# Patient Record
Sex: MALE | Race: WHITE | NOT HISPANIC OR LATINO | URBAN - METROPOLITAN AREA
[De-identification: names, ages, dates, MRNs, and addresses within clinical notes are randomized per-mention and may not be internally consistent; named-entity substitution may affect disease eponyms.]

---

## 2021-11-08 ENCOUNTER — TELEPHONE (OUTPATIENT)
Dept: PSYCHIATRY | Facility: CLINIC | Age: 31
End: 2021-11-08
Payer: COMMERCIAL

## 2021-11-08 NOTE — TELEPHONE ENCOUNTER
What is the concern that needs to be addressed by a nurse? Pt interested in starting TRD program, please call to schedule intake appts.     May a detailed message be left on voicemail? yes    Date of last office visit: na    Message routed to: slp new referrals

## 2022-03-13 ENCOUNTER — HEALTH MAINTENANCE LETTER (OUTPATIENT)
Age: 32
End: 2022-03-13

## 2022-03-14 ENCOUNTER — VIRTUAL VISIT (OUTPATIENT)
Dept: PSYCHIATRY | Facility: CLINIC | Age: 32
End: 2022-03-14
Payer: COMMERCIAL

## 2022-03-14 DIAGNOSIS — F33.2 SEVERE EPISODE OF RECURRENT MAJOR DEPRESSIVE DISORDER, WITHOUT PSYCHOTIC FEATURES (H): Primary | ICD-10-CM

## 2022-04-06 ENCOUNTER — VIRTUAL VISIT (OUTPATIENT)
Dept: PSYCHIATRY | Facility: CLINIC | Age: 32
End: 2022-04-06
Payer: COMMERCIAL

## 2022-04-06 DIAGNOSIS — F33.2 SEVERE EPISODE OF RECURRENT MAJOR DEPRESSIVE DISORDER, WITHOUT PSYCHOTIC FEATURES (H): Primary | ICD-10-CM

## 2022-04-06 RX ORDER — LAMOTRIGINE 100 MG/1
100 TABLET ORAL DAILY
COMMUNITY
Start: 2022-02-01

## 2022-04-06 RX ORDER — DULOXETIN HYDROCHLORIDE 60 MG/1
120 CAPSULE, DELAYED RELEASE ORAL DAILY
COMMUNITY
Start: 2022-02-01

## 2022-04-06 RX ORDER — CLONAZEPAM 1 MG/1
3 TABLET ORAL DAILY
COMMUNITY
Start: 2022-02-01

## 2022-04-06 RX ORDER — QUETIAPINE FUMARATE 100 MG/1
150 TABLET, FILM COATED ORAL AT BEDTIME
COMMUNITY
Start: 2022-02-01

## 2022-04-06 ASSESSMENT — PATIENT HEALTH QUESTIONNAIRE - PHQ9: SUM OF ALL RESPONSES TO PHQ QUESTIONS 1-9: 11

## 2022-04-06 NOTE — PROGRESS NOTES
"[facility] Treatment Resistant Depression Program  Diagnostic Assessment  A part of the [facility] Psychiatry Mood Disorders Program    Luis Armando Romero MRN# [id number]   Age: 31 year old YOB: 1990     Date of Evaluation: 3/14/22  Start Time: 3:00; End Time: 4:18      Mode of communication: [employer] (HIPAA compliant, secure platform). Patient consented verbally to this mode of therapy today.  Reason for telehealth: COVID-19. This patient visit was converted to a telehealth visit to minimize exposure to COVID-19.    Originating Location (patient location): home, located in [place], [place]  Distant Location (provider location): Home office, located in [place], [place], using appropriate privacy considerations and procedures         Care Team     PCP- No primary care provider on file.  Specialty Providers- no  Therapist- no  Psychiatric Med Management Provider- Dr Alegre at [facility]  Other Mental Health Providers- no    Referred by: family  Referred for evaluation of:  depression.         Contributors to the Assessment     Chart Reviewed.   Interview completed with Luis Armando MATHEWS Nick.  Releases of information signed by Luis Armando for none.  Collateral information obtained from none.           Chief Complaint     \"feel like it's [depression] gotten worse\"         History of Present Illness      Luis Armando Romero is a 31 year old male who goes by Luis Armando and uses he, him, his pronouns.    Luis Armando reports an ongoing and no psychiatric hospitalization(s). Luis Armando is interested in learning more about interventional treatment.    Luis Armando began having panic attacks when he was 9 or 10 years old. He was started on sertraline at that time and Luis Armando thinks the medication helped to some degree, but didn't resolve the symptoms. Luis Armando's first episode of depression was at age 11 or 12     Current psychosocial stressors discussed include not working, socially isolated, symptoms of depression and anxiety "     Discussed other mental health concerns apart from depression, including anxiety, panic attacks, trauma    Psych critical item history includes mutiple psychotropic trials  and substance use: alcohol    DATA     PHQ9 was completed today, 3/14/22  Scored at 12    Over the last 2 weeks, how often have you been bothered by any the following problems?    1. Little interest or pleasure in doing things: 3 - Nearly every day  2. Feeling down, depressed, or hopeless: 2 - More than half the days  3. Trouble falling or staying asleep, or sleeping too much: 1 - Several days  4. Feeling tired or having little energy: 3 - Nearly every day  5. Poor appetite or overeatin - Not at all  6. Feeling bad about yourself - or that you are a failure or have let yourself or your family down: 2 - More than half the days  7. Trouble concentrating on things, such as reading the newspaper or watching television: 1 - Several days  8. Moving or speaking so slowly that other people could have noticed. Or the opposite-being so fidgety or restless that you have been moving around a lot more than usual: 0 - Not at all  9. Thoughts that you would be better off dead, or of hurting yourself in some way: 0 - Not at all    If you checked off any problems, how difficulty have these problems made it for you to do your work, take care of things at home, or get along with other people? Very difficult     GAD7 was completed today, 3/14/22  Scored at 5    Over the last 2 weeks, how often have you been bothered by the following problems?    1. Feeling nervous, anxious or on edge: 1 - Several days  2. Not being able to stop or control worryin - Several days  3. Worrying too much about different things: 1 - Several days  4. Trouble relaxin - Not at all  5. Being so restless that it is hard to sit still: 0 - Not at all  6. Becoming easily annoyed or irritable: 2 - More than half the days  7. Feeling afraid as if something awful might happen: 0 - Not  "at all     CAGE-AID was completed today, 3/14/22  1. In the last three months, have you felt you should cut down or stop drinking or using drugs? yes  2. In the last three months, has anyone annoyed you or gotten on your nerves by telling you to cut down or stop drinking or using drugs? no  3. In the last three months, have you felt guilty or bad about how much you drink or use drugs? yes  4. In the last three months, have you been waking up wanting to have an alcoholic drink or use drugs? no         Psychiatric Review of Systems (Completed M.I.N.I. Version 7.0.2     A. DEPRESSION  Past 2 Weeks:  low mood nearly every day, anhedonia most of the time, low energy and difficulty concentrating, thinking or making decisions    Past Episode:  low mood nearly every day, anhedonia most of the time, low energy, worthlessness and/or guilt and difficulty concentrating, thinking or making decisions    B. SUICIDALITY: Current: denies, risk Low  -reports 0 lifetime suicide attempts  -reports 0% in response to \"How likely are to you to try to kill yourself within the next 3 months on a scale from 0-100%?\"  -denies current SI, denies plan and denies intent  -denies current SIB/Self Injurious Behavior, has a history of SIB in high school    C. DANIELA/HYPOMANIA  Current Episode:  none    Past Episode:  none    D. PANIC:  unprovoked anxiety/fear, peaking in < 10 minutes, heart palpitations, sweaty or clammy hands, tremors, SOB, dizziness, flushing or chills, extremity or Perioral (fingers, hand) paresthesia, derealization , depersonalization, fear of losing control or going crazy and fear of dying    E. AGORAPHOBIA:  marked fear/anxiety in enclosed space, alone away from home, alone at home and traveling by bus, train, car or using public transportation because of fear that escape might be difficult or help might not be available;    F. SOCIAL ANXIETY:  none    G. OBSESSIVE-COMPULSIVE:  none    H. TRAUMA:  none    I. ALCOHOL & J. " "NON-ALCOHOL:  See below    K. PSYCHOSIS:   none    L-M. EATING DISORDER: none    N. GENERALIZED ANXIETY:  none    O. RULE OUT MEDICAL, ORGANIC OR DRUG CAUSES FOR ALL DISORDERS  During any current disorder or past mood episode, patient reports:  A. Substance use or withdrawal: No  B. Medical illness: No    P. ANTISOCIAL PERSONALITY:  before age 14 - skipped school, ran away from home overnight, or stayed out against parents rules and since age 14 -  done illegal acts     Other Cluster B Traits Identified (not formally assessed):  none discussed    SUBSTANCE USE HISTORY                                                                 RECENT SUBSTANCE USE:     TOBACCO- none     CAFFEINE- unknown   ALCOHOL- two large drinks/day drink(s) per day \"drinking a lot the last two years\"  CANNABIS- none            OTHER ILLICIT DRUGS- none    Past Use-   TOBACCO- none       CAFFEINE- unknown   ALCOHOL-   CANNABIS- none            OTHER ILLICIT DRUGS- past kratum use, difficult to quit but hasn't used in in several years    CD Treatment Hx: No  Medical Consequences (eg HIV/Hepatitis)- No  Legal Consequences- No     PSYCHIATRIC HISTORY     Past diagnoses: panic disorder, major depressive disorder    Past medication trials: multiple    Hospitalizations: none    Commitment: No, Current Cohen order: No    ECT trials: No    TMS trials:  No      Ketamine:  No    Suicide attempts: No    Self-injurious behavior: Yes - past, as an adolescent    Violent behavior: Yes - verbal aggression when frustrated, has yelled at neighbors out of frustration. Luis Armando reports that he feels \"deep frustration\" with \"something not getting resolved.\" He notes that he can tolerate some degree of frustration but when he reaches his limit, he can blow up at people. Denies any physical aggression    Therapy, DBT, Day Treatment, Eating Disorder Tx etc]:   Current:  Medication management    Past:  Medication management and Outpatient individual " psychotherapy    SOCIAL and FAMILY HISTORY                        patient reported                                     Living situation: Luis Armando lives with his parents, in a Private Residence.   Guns, weapons, or other means to harm oneself in the home? No  Pets at home? Yes - one dog     Education: Luis Armando s highest level of education is GED    Occupation: Luis Armando is currently not working, at least in part due to symptoms of mental illness    Finances: Luis Armando is financial supported by Family Support    Relationships: Specific Relationships & Quality of Relationship: Luis Armando lives with his parents in a  part of the home and has dinner and spends time with them daily. He reports he tends to isolate a bit and struggles to engage socially, but has good friends from high school he keep in touch with through phone calls a few times/year.    Spiritual considerations: No    Cultural influences: Luis Armando identifies is race as white. Luis Armando reports  No  to cultural considerations to take into account when providing treatment.     Gender identity:  Luis Armando identifies as male and uses he/him/his pronouns.    Strengths & Coping Strategies:  Luis Armando is personable and easy to engage. He completed his GED after completing two years of high school in school.     Legal Hx: No    Trauma/Abuse Hx: None reported, also reports bullying significant enough for him to stop going to school for 11th and 12th grade     Hx: No    Family Mental Health Hx- father has depression; paternal cousin and aunt with depression; paternal cousin with bipolar    PAST PSYCH MED TRIALS      Will be reviewed during MTM.    MEDICAL / SURGICAL HISTORY                                   There is no problem list on file for this patient.      No past surgical history on file.     History of seizures: no   History of head trauma/loss of consciousness: unknown     ALLERGY                                Patient has no allergy information on  record.    MEDICATIONS                               No current outpatient medications on file.       VITALS                                                                                                                            3, 3   There were no vitals taken for this visit.     MENTAL STATUS EXAM                                                                                    9, 14 cog gs     Alertness: alert  and oriented  Appearance: unable to assess via video visit. Luis Armando reports that taking a shower is difficult for him, in part due to motivation and in part due to the condition of the bathroom  Behavior/Demeanor: cooperative, pleasant and passive, with fair  eye contact   Speech: increased latency of response  Language: intact and no problems  Psychomotor: normal or unremarkable  Mood: depressed  Affect: restricted and blunted; was congruent to mood; was congruent to content  Thought Process/Associations: unremarkable  Thought Content:  Reports none;  Denies violent ideation and delusions  Perception:  Reports none;  Denies auditory hallucinations and visual hallucinations  Insight: fair  Judgment: adequate for safety  Cognition: (6) does  appear grossly intact; formal cognitive testing was not done    PSYCHIATRIC DIAGNOSES                                                                                               Major depressive disorder  Panic disorder, by history     ASSESSMENT                                                                                                          m2, h3     Please note, writer did not receive all pertinent medical records as of the time of this assessment. Luis Armando did not sign ANDREA's for additional records.     Luis Armando Romero is a 31 year old single (never )  male with a psychiatric history of panic attacks and depression who presents for a [facility] Treatment Resistant Depression program evaluation. Luis Armando's father found the TRD program and  suggested an intake. He has a history of no psychiatric hospitalization.  Family mental health history includes depression, bipolar disorder, and anxiety.     Luis Armando's symptoms of depression started around the age of 11 or 12 and since then he has had some level of depression with periods of time that were more severe.    Today, Luis Armando presents as a Fair historian with Fair insight. He estimates one lifetime episodes of depression with onset at age 11 or 12. Luis Armando s past and present depressive symptoms seem consistent with a diagnosis of major depressive disorder. Depressive symptoms seem to contribute to impaired functioning in the areas of ADLs, social relationships, occupational performance and emotional wellbeing . Precipitating factors seem to include family history of mental illness, early onset of panic attacks and depression. Perpetuating factors may consist of bullying in high school, social isolation. Past treatment approaches include medication management, some individual therapy. Luis Armando is presently participating in medication management with interest in learning more about interventional treatments.    In addition, the M.I.N.I. Interview scores positively for a diagnosis/diagnoses of panic attacks. Substance use may be a current problem. Further diagnostic clarification is necessary to determine if alcohol use is affecting mood, anxiety.    Today, Luis Armando denies SI, denies a plan, and denies intent. He has notable risk factors for self-harm including lives alone/ isolated, severe anxiety and male.  However, risk is mitigated by no h/o suicide attempt, no plan or intent and no h/o risky impulsive behavior.  Based on all available evidence he does not appear to be at imminent risk for self-harm therefore does not meet criteria for a 72-hr hold/  involuntary hospitalization. Additional steps to minimize risk include: reaching out to parents if symptoms worsen, [facility]. 24/7 crisis resources were  provided verbally.     PLAN                                                                                                                        m2, h3   Next steps are as follows with intention of completing a comprehensive multi-disciplinary assessment utilizing today's evaluation, the expertise of a PharmD, as well as a Psychiatrist. Informed Luis Armando that if deemed appropriate for Interventional Psychiatry treatments, care will be provided with goal of stabilization with subsequent transfer back to the community (I.e. PCP or previous psychiatrist).    Medications: Will be addressed during an MTM visit and new patient medication evaluation with a Psychiatrist.     Therapy:  Yes - Luis Armando may benefit from restarting individual therapy. He could also consider a support group through [facility] or [facility]    Crisis Numbers:  did provided 24/7 crisis resources including but not limited to the following:  National Suicide Prevention Hotline: 1-585-363-CZAI (2571)  Crisis Text Line: Text START to 524-500    Other Referrals:  [facility]    RTC: For MTM visit.    MARY Latham

## 2022-04-06 NOTE — PROGRESS NOTES
Luis Armando is a 31 year old who is being evaluated via a billable video visit.      How would you like to obtain your AVS? MyChart  If the video visit is dropped, the invitation should be resent by: Send to e-mail at: [email or web address]  Will anyone else be joining your video visit? No      Video Start Time: 2:00 pm  Video-Visit Details    Type of service:  Video Visit    Video End Time:2:44 pm    Originating Location (pt. Location): Home    Distant Location (provider location):  [facility] PSY[facility]     Platform used for Video Visit: [email or web address]     Depression Response    Patient completed the PHQ-9 assessment for depression and scored >9? Yes  Question 9 on the PHQ-9 was positive for suicidality? No  Does patient have current mental health provider? Yes    Is this a virtual visit? Yes   Does patient have suicidal ideation (positive question 9)? No - offer to place Mental Health Referral.  Patient declined referral/not needed

## 2022-04-07 NOTE — PROGRESS NOTES
Service Date: 2022    M PHYSICIAN TRD PROGRAM MEDICATION THERAPY MANAGEMENT    This was a video visit from my home to the patient's home via [employer] due to COVID.  We used [employer], and in case we get disconnected, we will use the patient's [email or web address].  The patient's SMS is [phone].    Starting time 2:00 p.m.  Ending time 2:44 p.m.    DICTATION IDENTIFIER:  NAME:  Luis Armando Romero  :  1990.  VIDEO VISIT DATE:  2022.    The patient is a [facility] patient.     IDENTIFYING INFORMATION AND INTRODUCTION:  Luis Armando is a 31-year-old male seen on a video visit today for an M Physician TRD MT consultation.  He lives in [place], [place], and the patient is a new adult to the M Physician TRD program.    Psychiatrist is Maurice Au and he will see the patient on 2022 in person, which was communicated to the patient.    CHIEF COMPLAINTS:  Depression, some anxiety and panic attacks.    REASON FOR CONSULTATION:  Rating medication trials for antidepressant failure and assessment of antidepressant drugs and their history.    Informants include the patient and review of past medical records.  Please be aware I was not in the possession of most of his past medical records.    Time spent an hour without the patient and 44 minutes with the patient.    MEDICATION INFORMATION:    1.  Current Psychotropic Medications:  a.  Clonazepam 1 mg tablets.  The patient takes 3 mg at bedtime.  The patient is using clonazepam for the last 6 years and it appears that the dose has increased over time.  Indication:  Anxiety and sleep.  b.  Duloxetine/Cymbalta 60 mg capsule.  The patient takes 120 mg every a.m.  Indication:  Depression and anxiety.  c.  Lamotrigine 100 mg every morning.  Indication:  Mood stabilization,  d.  Quetiapine fumarate 150 mg at bedtime.  Indication:  Sleep.    2.  Concomitant Medications:  a.  Ibuprofen once per month for pain.    3.  Herbal Remedies:  a.  Vitamin D    4.   Drug/Drug Interactions:    a.  Concurrent use of clonazepam and quetiapine may result in increased risk of CNS depression.    5.  Current Reported Side Effects:  Questionable if his tiredness is from the medication, from the depression or from both.    6.  Gene Testing:  It was done.  The patient will try to find it and send it to the clinic or bring it to the clinic when he sees Dr. Au.    7.  Allergies:  Amoxicillin, rash.    PAST MEDICAL HISTORY:    1.  MDD.  2.  MER.  3.  PA.  4.  Asthma.  5.  Gallbladder removed laparoscopically.  6.  Family mental health:  Cousin with bipolar, aunt, depression, and dad has depression and anxiety.  7.  Self-injury behavior, cutting for 15 years.    DEPRESSION HISTORY:  1.  The patient stated that the whole depression issue started with a panic attack in summer of 2000 and he was treated and around 4257-8732, Zoloft was started.  It could have been that it was for the panic attacks as well as for the depression.    Last episode, patient said he cannot put a finger on it.  Since 2000, he is more or less incapacitated with his panic attacks and his depression.  2.  Hospitalization for severe suicidal ideation or suicide attempt:  Denied.  3.  Treatments:  Patient had partial hospitalization for 2 weeks and had outpatient treatment also in 1698-2636.  4.  Suicidal ideation:  The patient denied any suicidal ideation today.  5.  Individual psychotherapy:  The patient stated he saw counselors a lot of times in the past, but it was not very useful.  6.  Cognitive behavioral therapy:  Yes, was not effective.    SOCIAL AND ENVIRONMENTAL ASPECTS:  He lives with his parents and their dog.    PHARMACOTHERAPY INDICATORS:    A.  Pharmaceutical Aspects  1.  Economic Assessment:  The patient has MA, prescription coverage with his insurance plan.  Prescription drug copayment is 0.  The cost of obtaining prescribed medications does not interfere with compliance.  2.  Pharmacy:  The patient's  pharmacy is [facility] in [place].  3.  Compliance Assessment:  Patient is independent in medication administration.  He is a poor historian.  He does not use a pillbox and he says he misses medication maybe every 6 months or so.  When I asked the patient to whom he turns when the depression gets really severe, he says to my dad mostly.    B. Pharmacokinetic Aspects  4.  Habits and Chemical Use:  a. Alcohol:  The patient is currently drinking 2 strong drinks every night, bourbon.  I told the patient in case he is a TMS candidate, he has to stop drinking because it can lower the seizure threshold and he could have seizures with TMS.  b.  Tobacco:  He stopped 8 years ago.  c.  Caffeine:  A 16-ounce cup of coffee in the morning and 1 energy drink like Monster or Rockstar once a week, so I assume 16 ounces of regular coffee can be 250 mg of caffeine and then once a week Monster or Rockstar can be another  mg of caffeine.  d.  Recreational drugs:  Please be aware that the patient was using kratom for a whole year, and when he was younger, he used cannabis.  e.  Exercise:  He does not exercise currently.  In the summer, he walked around [facility] with his dad and he got a panic attack.  f.  Hobbies:  Not really.  He surfs the web, watches movies or TV.    RATING OF ANTIDEPRESSANT DRUGS:    Antidepressant treatment history using antidepressant resistance rating.  1.  Selective serotonin reuptake inhibitors (SSRIs):    a.  Citalopram/Celexa in 2007 was tried.  b.  Fluoxetine/Prozac was tried and worked for some time.  c.  Sertraline/Zoloft was the first drug tried around 4099-7595, and it was pushed up and it worked in the beginning.    2.  Serotonin noradrenaline reuptake inhibitors (SNRIs):  Mostly effective medications for patient so far.  a.  Duloxetine/Cymbalta from 2010 to present.  Max dose 120 mg per day.  The patient said it was effective in the beginning and whenever it lost its effectiveness the  dose was increased.  Overall it appears it was the best drug ever from all the medications he has tried.  Would give it a rating of 4.  b. Venlafaxine/Effexor from 6840-1001.  Max dose 300 mg per day.  It also worked somewhat and would give it a rating of 3.    3.  Serotonin modulators and stimulators:  Negative.    4.  Noradrenaline and dopamine reuptake inhibitors (NDRIs):  a.  Bupropion/Wellbutrin was tried.  It helped slightly with the depression, but his anxiety went up.    5.  Tricyclic antidepressants (TCAs):  The patient stated that he was on a tricyclic antidepressant at the outpatient treating center with no change in his depression or anxiety, but he does not remember which drug.    6.  Tetracyclic antidepressants:  Negative.    7.  Monoamine oxidase inhibitors (MAOIs):  Negative.    8.  Augmentation Therapy:  a.  Lamotrigine 200 mg per day.  Max dose 200 mg per day from 06/2008 to present.    9.  Miscellaneous augmentation therapy:  -- Antipsychotics.  a.  Aripiprazole/Abilify:  Yes.  He got restless legs with it.  b.  Brexpiprazole/Rexulti:  No change.  c.  Quetiapine/Seroquel:  Max dose 150 mg per day from 2008 to present.  It is used mostly for sleep.    -- Stimulants:  a.  Lisdexamfetamine/Vyvanse was tried in 2014, but stopped very fast due to increase in anxiety.    -- Benzodiazepines:    a.  Clonazepam from 2006 to present.  Max dose 3 mg per day.  The patient stated it works.    10.  Miscellaneous sleep aids:  a.  Diphenhydramine/Benadryl was tried and it works.  b.  Ambien/zolpidem:  Patient was very anxious to take it for a while because he was reading the side effects.  c.  Melatonin was tried.    d.  Clonazepam was tried.  e.  Doxylamine/Unisom was tried.    11.  Ketamine treatment history:  Negative.    12.  Other reported treatments for depression and related mood disorders:  Negative.    COMMENTS:    1.  The patient will follow up with MTM as needed.  2.  All MTM findings will be shared  with clinic psychiatrist.  3.  The patient was instructed to return for upcoming appointment with Dr. Au for recommendation and future treatment options.    Thank you for the opportunity to participate in the care of this patient.    Jyoti Alcocer, Mayank  Pharmaceutical Care Coordinator    Jyoti Alcocer, Mayank        D: 2022   T: 2022   MT: KARLA    Name:     UMESH DENNY  MRN:      [id number]        Account:      [id number]   :      1990           Service Date: 2022       Document: [id number]

## 2022-04-08 ENCOUNTER — OFFICE VISIT (OUTPATIENT)
Dept: PSYCHIATRY | Facility: CLINIC | Age: 32
End: 2022-04-08
Payer: COMMERCIAL

## 2022-04-08 VITALS
HEIGHT: 73 IN | SYSTOLIC BLOOD PRESSURE: 150 MMHG | DIASTOLIC BLOOD PRESSURE: 106 MMHG | BODY MASS INDEX: 33.56 KG/M2 | HEART RATE: 105 BPM | TEMPERATURE: 97.5 F | WEIGHT: 253.2 LBS

## 2022-04-08 DIAGNOSIS — F33.1 MODERATE EPISODE OF RECURRENT MAJOR DEPRESSIVE DISORDER (H): Primary | ICD-10-CM

## 2022-04-08 ASSESSMENT — PATIENT HEALTH QUESTIONNAIRE - PHQ9: SUM OF ALL RESPONSES TO PHQ QUESTIONS 1-9: 10

## 2022-04-08 NOTE — PROGRESS NOTES
" [facility]  [address], [address]  [place] [address]  New Patient Evaluation       Luis Armando Romero is a 31 year old male who prefers the name Luis Armando and pronoun he, him, his.  Therapist: None at present.  Psychiatrist: Viviana Alegre MD, [facility]  PCP: No primary care provider on file.  Other Providers: None  Referred by Dr Alegre for evaluation of depression.   (With urging of father.)  History was provided by patient who was a somewhat vague and laconic historian.   Very limited medical records were available.  Patient's father Rah was also present for visit.     Chief Complaint                                                                                                        \" I feel like I've tried all the conventional stuff and nothing has worked \"     History of Present Illness                                                                                      Pertinent Background and Present Symptoms:  Sx reportedly started 10-9yo as panic attacks, developed into depressive sx 1-2 yrs later.  Per the DA, there has never been a period of recovery. There have been periods of symptoms being better than they are now. He would take walks and do more outside the house, took some community college classes, \"I don't know if I felt better or I just had more motivation\".    Current reported sx are anhedonia, fatigue, ruminative thinking, difficulty concentrating.  Asked what he would most want to target, \"my mood\", but agrees that it is hard to distinguish this from the anxiety sometimes.   Identifies deactivation and habit as a , \"If I've not done something for a while, then I don't really have the motivation to do it, and I would need a warning.\" Difficulty with initiating new activities/changes is a major difficulty. \"It's just a feeling of not wanting things to change. That feels comfortable and safe.\"  He denied suicidality at the DA. Today he reports no " "suicidality via PHQ9; we did not follow up the topic in detail.    Raises an interesting question, \"Do I even want to get better? Because then I'd need to go get a job, move out, do all these things. But maybe that's just my brain thinking the wrong way.\"    Regarding therapy, \"I tried CBT more than once\", \"I'm a little cynical of it.\" He was offered interoceptive exposure, but sounds as though it was not explained as why this would be beneficial, and felt that it was not relevant to his panic attack symptoms.  He does not recall the specific providers.    Family therapy/systems assessment was done last in 2006. It was related to \"outbursts of extreme anger\". This is what led to use of the lamotrigine. Rah notes \"we would force, bribe, punish, drive him to school, we tried a whole bunch of stuff, and slowly he'd do less and less.\"        Anxiety:   Endorsed panic attacks, starting >20yr ago as noted above, with \"unprovoked anxiety/fear, peaking in < 10 minutes, heart palpitations, sweaty or clammy hands, tremors, SOB, dizziness, flushing or chills, extremity or Perioral (fingers, hand) paresthesia, derealization , depersonalization\".  Also endorsed agoraphobia, with avoidance of being alone away from home, using public transportation.  He does feel that the panic attacks have limited his behavior in terms of adding avoidances. He does not actively think/worry about them.  During school, this was limiting in terms of \"it just took up mental bandwidth that made it difficult\".Does not endorse specific topics of worries consistent with MER.  Does feel like these symptoms are reduced by clonazepam.  No evidence of compulsive behavior.      PTSD: Denies traumatic experience of sufficient severity to meet criterion A necessary for diagnosis of PTSD.    Regine: Negative  Psychosis: Negative   Eating disorder: Negative  Homicidal Ideation: Negative         Recent Substance Use:  He did screen CAGE positive for etOH use at " "initial DA, and endorsed two \"large\" drinks of hard alcohol daily.  \"I don't think it's a problem... yet.\" Because his use is not increasing, he feels it has been constant for at least 6 months or a year. \"It just takes away the negative thoughts.\"           Substance Use History     Past Use- Positive for alcohol and cannabis, also kratom, tobacco (quit >8yr ago). Stopped kratom bc \"it wasn't sustainable\" , needing more to get euphoriant effect.  Treatment [#, most recent]- None    Medical Consequences [withdrawal, sz etc]- None   HIV/Hepatitis- SIB- None    Legal Consequences- None       Psychiatric History   Past diagnoses:   Panic attacks/disorder   Major depressive disorder         \"I do feel like I have some autistic tendencies\", in terms of wanting routine to stay the same.  Does not feel he has specific social difficulties, and feels that he can  on cues from people he knows.    Suicidal ideation- None   Suicide Attempt:   #- 0   Most Recent- NA  SIB- Reportedly yes, as adolescent   Regine- None    Psychosis- None    Violence/Aggression- Verbal, tendency towards internalizing frustration and then blowing up. Describes some dissociating during these, \"accusing people of things that didn't really happen\".  Psych Hosp- None   ECT- None   TMS - None   Eating Disorder- None   Outpatient Programs [ DBT, Day Treatment, Eating Disorder Tx etc]- Has done partial hospitalization in the past         Social/ Family History               [per patient report]                                                   FINANCIAL SUPPORT- Family support; not currently working.        RELATIONSHIPS/CHILDREN- Close family and some friends he sees a few times per year       LIVING SITUATION- Home with parents      LEGAL- None  EARLY HISTORY/ EDUCATION- Completed GED.  SOCIAL/ SPIRITUAL SUPPORT- Modest at best       CULTURAL INFLUENCES/ IMPACT- No       TRAUMA HISTORY (self-report)- Was bullied in high school, which led to stopping " school/completing GED.  FAMILY HISTORY-   father has depression; paternal cousin and aunt with depression; paternal cousin with bipolar       Psychiatric Medication Trials       Adequate dose and duration  Duloxetine, to 120mg, for >1yr. Partly effective.  Venlafaxine, to 300mg, for >2yr.    Limited by side effects  Bupropion (anxiety went up)    Inadequate dose/duration  [none noted]    Information uncertain  Citalopram, fluoxetine, and sertraline have been tried, but dose/duration are unknown  There is report of a tricyclic but none documented.    Augmentation  Aripiprazole gave restless legs.  Brexiprazole not effective, though dose unknown.    Other  Clonazepam at 3mg HS. (Stable for 3-4 yrs.)   Quetiapine for sleep  Lamotrigine 100mg  Stimulants increase his anxiety.       Medical / Surgical History   There is no problem list on file for this patient.      No past surgical history on file.      Medical Review of Systems                                                                                                      Medical ROS not performed today.    Metals Screen   Yes No Item    X Implanted or lodged metals in body    X Implanted surgical devices    X Metal containing facial or scalp tattoos    X Non removable piercings   Seizure Screen  Yes No Item    X Current Seizure Disorder?    X History of Seizure?     Does patient have a cochlear implant? ____N______  Does patient have any shunts?_______N____  Does patient have a pacemaker?_____N_____  Does patient have a vagus nerve stimulator?____N______  Does patient have a deep brain stimulator?____N______  Any other implanted device?__________N______       Allergy   Amoxicillin     Current Medications     Current Outpatient Medications   Medication Sig Dispense Refill     clonazePAM (KLONOPIN) 1 MG tablet Take 3 mg by mouth daily Take 3 Tablets (3 mg) by mouth at bedtime as needed for Anxiety. Up to 3 tabs per night as needed.       DULoxetine (CYMBALTA) 60 MG  capsule Take 120 mg by mouth daily       lamoTRIgine (LAMICTAL) 100 MG tablet Take 100 mg by mouth daily       QUEtiapine (SEROQUEL) 100 MG tablet Take 150 mg by mouth At Bedtime          Vitals                                                                                                                             There were no vitals taken for this visit.      Mental Status Exam                                                                                        Alertness: alert  and oriented  Appearance: Neatly groomed, casually dressed.  Behavior/Demeanor: cooperative, pleasant and calm, with good  eye contact   Speech: normal and regular rate and rhythm . Answers were relatively short and concrete.Prosody somewhat diminished.  Language: intact and no problems  Psychomotor: normal or unremarkable  Mood: depressed and anxious  Affect: restricted; was congruent to mood; was congruent to content  Thought Process/Associations: unremarkable  Thought Content:  Reports none;  Denies suicidal ideation and violent ideation  Perception:  Reports none;  Denies auditory hallucinations and visual hallucinations  Insight: fair  Judgment: good  Cognition: (6) does  appear grossly intact; formal cognitive testing was not done  Gait and Station: unremarkable     Labs and Data     Rating Scales:    PHQ9    PHQ9 Today:  10  PHQ 4/6/2022 4/8/2022   PHQ-9 Total Score 11 10   Q9: Thoughts of better off dead/self-harm past 2 weeks Not at all Not at all         No lab results found.  No lab results found.    Diagnosis and Assessment                                                                                  Luis Armando Romero is a 31 year old male with previous psychiatric history of MDD, recurrent, severe who presents for evaluation of depression and discussion of advanced therapeutic options. Diagnostically, it is very hard to split this between anxiety and depression. There is, in either case, a strong tendency towards  developin avoidance/deactivation habits, and he has gotten deeply stuck in this. It appears that a classic vicious cycle of deactivation leading to shame and more deactivation has taken hold. It is also possible that his family is inadvertently perpetuating and supporting the cycle, based on the apparent level of parental involvement. I did also consider whether an autism spectrum diagnosis might be at play, but it is difficult to establish any criteria beyond inflexibility.    He has tried multiple antidepressants. Some are at adequate dose/duration but overall my impression is that he has been underdosed. If anxiety is a prominent component, he likely needs serotonergics at or likely above standard  max dose. The response to 120mg of duloxetine further hints at this.    There has definitely been psychoterhapy, but I am not sure it has been adequate. I do not see evidence that there has been direct, evidence-based treatment of his anxiety and avoidance. I also wonder whether there is a need for family therapy, given the concern about accomodation above. If anythin,g I think therapy is going to be more important than medications/TMS in his recovery.   Medication-wise, between alcohol and benzodiazepine, he is on a very high dose of anti-TERRANCE agents. This again suggests to me that depression may not be the primary entity here.      That said, he is burdened by his chronic symptoms of depression and his current episode has lasted over yearscausing significant psychosocial dysfunction despite multiple trials of psychotropic medications and individual therapy. Due to remaining profound depression and numerous failed previous treatment modalities, the patient is a candidate for rTMS treament at least. I would not do ketamine given that we do not have a clear episodic or suicidal picture (though I might consider it post-TMS).   He is the canonical patient for whom TMS needs to be combined with and used as an  augmenter of therapy.      The risks, benefits, alternatives and potential adverse effects of the above have been explained and are understood by the patient. Luis Armando Romero agrees to the treatment plan with the ability to do so. The pt knows to call the clinic for any problems or access emergency care if needed.   Medical considerations relevant to treatment are: None  Substance concerns relevant to treatment are: I do think the drinking is higher than desirable, but not an acute concern.        Suicide Risk Assessment:  Today Luis Armando Romero reports minimal to no suicidality.    Today the following issues were addressed:    1) Depression  2) Anxiety    [place] Prescription Monitoring Program [] review was not needed today.    PSYCHOTROPIC DRUG INTERACTIONS: none clinically relevant        Plan                                                                                                                          1) Major depressive disorder/panic disorder  -- Medications:   1) Seriously consider going down on the clonazepam. Slowly, taperingly,and maybe leave 1mg on, but this is a high dose and I wonder if it is impairing motivational systems.    2) I would frame this as an anxiety/avoidance problem, and in that context, would try anxiety strategies:  - Any selective serotonin reuptake inhibitor that is tolerated, but above  maximum dose. The canonical is sertraline, even up to 400mg if tolerated, but 30+ of escitalopram or 100+ of fluoxetine is also possible.  - If that does not work, trial tricyclic. I do not have a strong preference between them, whichever is most comofrtable for you and him.  - Possibly buspirone; the chance of benefit is always low but it works well when it does work.    3) If all of the other recommendations are tried and amotivation is an issue, consider pramipexole for dopamine agonism. I am happy to provide further recommendaitons and info when we get to this point.    --  Psychotherapy:  This is absolutely the mainstay of what needs to happen next. See above. My strong preference would be for a behavioral activation or exposure-oriented regime. He asked about psychodynamic, and it is not first line, but it is evidence-based, and I provided the [facility] website.  - As noted above, I would consider a referral for family therapy, as I am relatively confident that family dynamics are not helping. We do have some internal capabilities for this if desired.    -- Procedures:    - I would consider the use of TMS to reduce symptoms/increase cognitive flexibility to promote therapy engagement. However, I have told him that I am not prepared to submit that prior authorization until and unless therapy is in place.    -- Referrals: None right now, but see above for what we could offer if not available internally with [facility].          Care Team   Outpatient Prescriber: Outpatient Therapist: YASMINE Psychiatrist: YASMINE Therapist: OLGA completed by: Pharmacist Consult:   Dr Viviana Alegre, [facility]   Formulation (primary and secondary factors guiding treatment plan):   Chief concern: Amotivation, anhedonia, low mood  Primary diagnosis: MDD, recurrent, moderate-severe  Secondary diagnoses/factors: Panic disorder with agoraphobia   Treatment plan (What are the next 1-3 steps in treatment?)   Elements to consider:  Procedures (ECT, TMS, VNS): TMS -- but only once therapy in place  If TMS: Which coil? No preference    Medications (ketamine, MAOI):  Who will prescribe medication?  Primary psychiatrist; have recommended reduction in BZD and use of anxiety-dose SSRIs.  Pramipexole is further down the list.  Will pt need dietary counseling? No  Does pt need to taper (e.g., benzo) or stop (e.g., washout) medication?       I believe BZD taper is indicated for trial and have recommended.    Psychotherapy (BA, PE, CPT, DBT):  Are we recommending BA concurrent to a procedure/medication?  "Yes, or even as primary. We are requiring it as a prerequisite to TMS.  Are we recommending another form of therapy?  Exposure would be a reasonable alternative, carey interoceptive.  Have also suggested a family therapy referral.    Are we recommending concurrent/combination treatments?  Yes, TMS+meds+therapy.    How is waitlist affecting plan? Is not, yet.     What ancillary supports/resources/therapies need to be organized by our team?   Does pt need an outpatient psychiatric prescriber?  No  Does pt need to establish with a therapist? Yes  Are we recommending a therapy not provided by our program (e.g. DBT, PE, CPT)?   No, although he likely has it available.     Clinical Global Impression - Severity (at DA) / Improvement (at FU)   Considering your total clinical experience with this particular population, how severely ill is the patient at this time?  Score (1-7): 4   What is the plan and rough timeframe for completing care with TRD Program?   What is the primary endpoint/goal of treatment?  Establish therapy, engage with it, and then give a \"boost\" with TMS.  Timeframe for completion of this episode of TRD care? 3-6 months    Who will continue outpatient medication management?  Dr Aelgre  Has this been communicated to pt?  Yes  Does a care coordination call with outpatient provider(s) need to be scheduled?      No    Will the pt need ongoing psychotherapy?    Maybe  Will there be ongoing follow-up for ketamine?    No  Has lab monitoring been ordered? No    Next appointment: Not currently made           CRISIS NUMBERS:   Provided routinely in AVS.    Treatment Risk Statement:  The patient understands the risks, benefits, adverse effects and alternatives. Agrees to treatment with the capacity to do so. No medical contraindications to treatment. Agrees to call clinic for any problems. The patient understands to call 911 or go to the nearest ED if life threatening or urgent symptoms occur.            PROVIDER:  Mauriec CORREA" MD Roe    Time based billing.  Time on day of service includes:  60min spent face to face with the patient   45 minutes pre-reviewing records  30 minutes preparing consultation note and follow up messages/documentation    Physician Attestation   I, Maurice Au MD, saw this patient and agree with the findings and plan of care as documented in the note.      Items personally reviewed/procedural attestation: I was present for and supervised the entire  procedure.    Maurice Au MD

## 2022-04-08 NOTE — LETTER
"4/8/2022       RE: Luis Armando Romero  [address]  [facility] [address]     Dear Colleague,    Thank you for referring your patient, Luis Armando Romero, to the [facility] at [facility]. Please see a copy of my visit note below.     [facility] TMS [facility]  [address], [address]  [place] [address]  New Patient Evaluation       Luis Armando Romero is a 31 year old male who prefers the name Luis Armando and pronoun he, him, his.  Therapist: None at present.  Psychiatrist: Viviana Alegre MD, [facility]  PCP: No primary care provider on file.  Other Providers: None  Referred by Dr Alegre for evaluation of depression.   (With urging of father.)  History was provided by patient who was a somewhat vague and laconic historian.   Very limited medical records were available.  Patient's father Rah was also present for visit.     Chief Complaint                                                                                                        \" I feel like I've tried all the conventional stuff and nothing has worked \"     History of Present Illness                                                                                      Pertinent Background and Present Symptoms:  Sx reportedly started 10-12yo as panic attacks, developed into depressive sx 1-2 yrs later.  Per the DA, there has never been a period of recovery. There have been periods of symptoms being better than they are now. He would take walks and do more outside the house, took some community college classes, \"I don't know if I felt better or I just had more motivation\".    Current reported sx are anhedonia, fatigue, ruminative thinking, difficulty concentrating.  Asked what he would most want to target, \"my mood\", but agrees that it is hard to distinguish this from the anxiety sometimes.   Identifies deactivation and habit as a , \"If I've not done something for a while, then I don't " "really have the motivation to do it, and I would need a warning.\" Difficulty with initiating new activities/changes is a major difficulty. \"It's just a feeling of not wanting things to change. That feels comfortable and safe.\"  He denied suicidality at the . Today he reports no suicidality via PHQ9; we did not follow up the topic in detail.    Raises an interesting question, \"Do I even want to get better? Because then I'd need to go get a job, move out, do all these things. But maybe that's just my brain thinking the wrong way.\"    Regarding therapy, \"I tried CBT more than once\", \"I'm a little cynical of it.\" He was offered interoceptive exposure, but sounds as though it was not explained as why this would be beneficial, and felt that it was not relevant to his panic attack symptoms.  He does not recall the specific providers.    Family therapy/systems assessment was done last in 2006. It was related to \"outbursts of extreme anger\". This is what led to use of the lamotrigine. Rah notes \"we would force, bribe, punish, drive him to school, we tried a whole bunch of stuff, and slowly he'd do less and less.\"        Anxiety:   Endorsed panic attacks, starting >20yr ago as noted above, with \"unprovoked anxiety/fear, peaking in < 10 minutes, heart palpitations, sweaty or clammy hands, tremors, SOB, dizziness, flushing or chills, extremity or Perioral (fingers, hand) paresthesia, derealization , depersonalization\".  Also endorsed agoraphobia, with avoidance of being alone away from home, using public transportation.  He does feel that the panic attacks have limited his behavior in terms of adding avoidances. He does not actively think/worry about them.  During school, this was limiting in terms of \"it just took up mental bandwidth that made it difficult\".Does not endorse specific topics of worries consistent with MER.  Does feel like these symptoms are reduced by clonazepam.  No evidence of compulsive " "behavior.      PTSD: Denies traumatic experience of sufficient severity to meet criterion A necessary for diagnosis of PTSD.    Regine: Negative  Psychosis: Negative   Eating disorder: Negative  Homicidal Ideation: Negative         Recent Substance Use:  He did screen CAGE positive for etOH use at initial DA, and endorsed two \"large\" drinks of hard alcohol daily.  \"I don't think it's a problem... yet.\" Because his use is not increasing, he feels it has been constant for at least 6 months or a year. \"It just takes away the negative thoughts.\"           Substance Use History     Past Use- Positive for alcohol and cannabis, also kratom, tobacco (quit >8yr ago). Stopped kratom bc \"it wasn't sustainable\" , needing more to get euphoriant effect.  Treatment [#, most recent]- None    Medical Consequences [withdrawal, sz etc]- None   HIV/Hepatitis- SIB- None    Legal Consequences- None       Psychiatric History   Past diagnoses:   Panic attacks/disorder   Major depressive disorder         \"I do feel like I have some autistic tendencies\", in terms of wanting routine to stay the same.  Does not feel he has specific social difficulties, and feels that he can  on cues from people he knows.    Suicidal ideation- None   Suicide Attempt:   #- 0   Most Recent- NA  SIB- Reportedly yes, as adolescent   Regine- None    Psychosis- None    Violence/Aggression- Verbal, tendency towards internalizing frustration and then blowing up. Describes some dissociating during these, \"accusing people of things that didn't really happen\".  Psych Hosp- None   ECT- None   TMS - None   Eating Disorder- None   Outpatient Programs [ DBT, Day Treatment, Eating Disorder Tx etc]- Has done partial hospitalization in the past         Social/ Family History               [per patient report]                                                   FINANCIAL SUPPORT- Family support; not currently working.        RELATIONSHIPS/CHILDREN- Close family and some " friends he sees a few times per year       LIVING SITUATION- Home with parents      LEGAL- None  EARLY HISTORY/ EDUCATION- Completed GED.  SOCIAL/ SPIRITUAL SUPPORT- Modest at best       CULTURAL INFLUENCES/ IMPACT- No       TRAUMA HISTORY (self-report)- Was bullied in high school, which led to stopping school/completing GED.  FAMILY HISTORY-   father has depression; paternal cousin and aunt with depression; paternal cousin with bipolar       Psychiatric Medication Trials       Adequate dose and duration  Duloxetine, to 120mg, for >1yr. Partly effective.  Venlafaxine, to 300mg, for >2yr.    Limited by side effects  Bupropion (anxiety went up)    Inadequate dose/duration  [none noted]    Information uncertain  Citalopram, fluoxetine, and sertraline have been tried, but dose/duration are unknown  There is report of a tricyclic but none documented.    Augmentation  Aripiprazole gave restless legs.  Brexiprazole not effective, though dose unknown.    Other  Clonazepam at 3mg HS. (Stable for 3-4 yrs.)   Quetiapine for sleep  Lamotrigine 100mg  Stimulants increase his anxiety.       Medical / Surgical History   There is no problem list on file for this patient.      No past surgical history on file.      Medical Review of Systems                                                                                                      Medical ROS not performed today.    Metals Screen   Yes No Item    X Implanted or lodged metals in body    X Implanted surgical devices    X Metal containing facial or scalp tattoos    X Non removable piercings   Seizure Screen  Yes No Item    X Current Seizure Disorder?    X History of Seizure?     Does patient have a cochlear implant? ____N______  Does patient have any shunts?_______N____  Does patient have a pacemaker?_____N_____  Does patient have a vagus nerve stimulator?____N______  Does patient have a deep brain stimulator?____N______  Any other implanted device?__________N______        Allergy   Amoxicillin     Current Medications     Current Outpatient Medications   Medication Sig Dispense Refill     clonazePAM (KLONOPIN) 1 MG tablet Take 3 mg by mouth daily Take 3 Tablets (3 mg) by mouth at bedtime as needed for Anxiety. Up to 3 tabs per night as needed.       DULoxetine (CYMBALTA) 60 MG capsule Take 120 mg by mouth daily       lamoTRIgine (LAMICTAL) 100 MG tablet Take 100 mg by mouth daily       QUEtiapine (SEROQUEL) 100 MG tablet Take 150 mg by mouth At Bedtime          Vitals                                                                                                                             There were no vitals taken for this visit.      Mental Status Exam                                                                                        Alertness: alert  and oriented  Appearance: Neatly groomed, casually dressed.  Behavior/Demeanor: cooperative, pleasant and calm, with good  eye contact   Speech: normal and regular rate and rhythm . Answers were relatively short and concrete.Prosody somewhat diminished.  Language: intact and no problems  Psychomotor: normal or unremarkable  Mood: depressed and anxious  Affect: restricted; was congruent to mood; was congruent to content  Thought Process/Associations: unremarkable  Thought Content:  Reports none;  Denies suicidal ideation and violent ideation  Perception:  Reports none;  Denies auditory hallucinations and visual hallucinations  Insight: fair  Judgment: good  Cognition: (6) does  appear grossly intact; formal cognitive testing was not done  Gait and Station: unremarkable     Labs and Data     Rating Scales:    PHQ9    PHQ9 Today:  10  PHQ 4/6/2022 4/8/2022   PHQ-9 Total Score 11 10   Q9: Thoughts of better off dead/self-harm past 2 weeks Not at all Not at all         No lab results found.  No lab results found.    Diagnosis and Assessment                                                                                  Luis Armando MATHEWS  Nick is a 31 year old male with previous psychiatric history of MDD, recurrent, severe who presents for evaluation of depression and discussion of advanced therapeutic options. Diagnostically, it is very hard to split this between anxiety and depression. There is, in either case, a strong tendency towards developin avoidance/deactivation habits, and he has gotten deeply stuck in this. It appears that a classic vicious cycle of deactivation leading to shame and more deactivation has taken hold. It is also possible that his family is inadvertently perpetuating and supporting the cycle, based on the apparent level of parental involvement. I did also consider whether an autism spectrum diagnosis might be at play, but it is difficult to establish any criteria beyond inflexibility.    He has tried multiple antidepressants. Some are at adequate dose/duration but overall my impression is that he has been underdosed. If anxiety is a prominent component, he likely needs serotonergics at or likely above standard  max dose. The response to 120mg of duloxetine further hints at this.    There has definitely been psychoterhapy, but I am not sure it has been adequate. I do not see evidence that there has been direct, evidence-based treatment of his anxiety and avoidance. I also wonder whether there is a need for family therapy, given the concern about accomodation above. If anythin,g I think therapy is going to be more important than medications/TMS in his recovery.   Medication-wise, between alcohol and benzodiazepine, he is on a very high dose of anti-TERRANCE agents. This again suggests to me that depression may not be the primary entity here.      That said, he is burdened by his chronic symptoms of depression and his current episode has lasted over yearscausing significant psychosocial dysfunction despite multiple trials of psychotropic medications and individual therapy. Due to remaining profound depression and  numerous failed previous treatment modalities, the patient is a candidate for rTMS treament at least. I would not do ketamine given that we do not have a clear episodic or suicidal picture (though I might consider it post-TMS).   He is the canonical patient for whom TMS needs to be combined with and used as an augmenter of therapy.      The risks, benefits, alternatives and potential adverse effects of the above have been explained and are understood by the patient. Luis Armando Romero agrees to the treatment plan with the ability to do so. The pt knows to call the clinic for any problems or access emergency care if needed.   Medical considerations relevant to treatment are: None  Substance concerns relevant to treatment are: I do think the drinking is higher than desirable, but not an acute concern.        Suicide Risk Assessment:  Today Luis Armando Romero reports minimal to no suicidality.    Today the following issues were addressed:    1) Depression  2) Anxiety    [place] Prescription Monitoring Program [] review was not needed today.    PSYCHOTROPIC DRUG INTERACTIONS: none clinically relevant        Plan                                                                                                                          1) Major depressive disorder/panic disorder  -- Medications:   1) Seriously consider going down on the clonazepam. Slowly, taperingly,and maybe leave 1mg on, but this is a high dose and I wonder if it is impairing motivational systems.    2) I would frame this as an anxiety/avoidance problem, and in that context, would try anxiety strategies:  - Any selective serotonin reuptake inhibitor that is tolerated, but above  maximum dose. The canonical is sertraline, even up to 400mg if tolerated, but 30+ of escitalopram or 100+ of fluoxetine is also possible.  - If that does not work, trial tricyclic. I do not have a strong preference between them, whichever is most comofrtable for you and  him.  - Possibly buspirone; the chance of benefit is always low but it works well when it does work.    3) If all of the other recommendations are tried and amotivation is an issue, consider pramipexole for dopamine agonism. I am happy to provide further recommendaitons and info when we get to this point.    -- Psychotherapy:  This is absolutely the mainstay of what needs to happen next. See above. My strong preference would be for a behavioral activation or exposure-oriented regime. He asked about psychodynamic, and it is not first line, but it is evidence-based, and I provided the [facility] website.  - As noted above, I would consider a referral for family therapy, as I am relatively confident that family dynamics are not helping. We do have some internal capabilities for this if desired.    -- Procedures:    - I would consider the use of TMS to reduce symptoms/increase cognitive flexibility to promote therapy engagement. However, I have told him that I am not prepared to submit that prior authorization until and unless therapy is in place.    -- Referrals: None right now, but see above for what we could offer if not available internally with [facility].          Care Team   Outpatient Prescriber: Outpatient Therapist: YASMINE Psychiatrist: YASMINE Therapist: OLGA completed by: Pharmacist Consult:   Dr Viviana Alegre, [facility] None [place] None [facility]er   Formulation (primary and secondary factors guiding treatment plan):   Chief concern: Amotivation, anhedonia, low mood  Primary diagnosis: MDD, recurrent, moderate-severe  Secondary diagnoses/factors: Panic disorder with agoraphobia   Treatment plan (What are the next 1-3 steps in treatment?)   Elements to consider:  Procedures (ECT, TMS, VNS): TMS -- but only once therapy in place  If TMS: Which coil? No preference    Medications (ketamine, MAOI):  Who will prescribe medication?  Primary psychiatrist; have recommended reduction in BZD and use of anxiety-dose  "SSRIs.  Pramipexole is further down the list.  Will pt need dietary counseling? No  Does pt need to taper (e.g., benzo) or stop (e.g., washout) medication?       I believe BZD taper is indicated for trial and have recommended.    Psychotherapy (BA, PE, CPT, DBT):  Are we recommending BA concurrent to a procedure/medication? Yes, or even as primary. We are requiring it as a prerequisite to TMS.  Are we recommending another form of therapy?  Exposure would be a reasonable alternative, carey interoceptive.  Have also suggested a family therapy referral.    Are we recommending concurrent/combination treatments?  Yes, TMS+meds+therapy.    How is waitlist affecting plan? Is not, yet.     What ancillary supports/resources/therapies need to be organized by our team?   Does pt need an outpatient psychiatric prescriber?  No  Does pt need to establish with a therapist? Yes  Are we recommending a therapy not provided by our program (e.g. DBT, PE, CPT)?   No, although he likely has it available.     Clinical Global Impression - Severity (at DA) / Improvement (at FU)   Considering your total clinical experience with this particular population, how severely ill is the patient at this time?  Score (1-7): 4   What is the plan and rough timeframe for completing care with TRD Program?   What is the primary endpoint/goal of treatment?  Establish therapy, engage with it, and then give a \"boost\" with TMS.  Timeframe for completion of this episode of TRD care? 3-6 months    Who will continue outpatient medication management?  Dr Alegre  Has this been communicated to pt?  Yes  Does a care coordination call with outpatient provider(s) need to be scheduled?      No    Will the pt need ongoing psychotherapy?    Maybe  Will there be ongoing follow-up for ketamine?    No  Has lab monitoring been ordered? No    Next appointment: Not currently made           CRISIS NUMBERS:   Provided routinely in AVS.    Treatment Risk Statement:  The patient " understands the risks, benefits, adverse effects and alternatives. Agrees to treatment with the capacity to do so. No medical contraindications to treatment. Agrees to call clinic for any problems. The patient understands to call 911 or go to the nearest ED if life threatening or urgent symptoms occur.            PROVIDER:  Maurice Au MD    Time based billing.  Time on day of service includes:  60min spent face to face with the patient   45 minutes pre-reviewing records  30 minutes preparing consultation note and follow up messages/documentation    Physician Attestation   I, Maurice Au MD, saw this patient and agree with the findings and plan of care as documented in the note.      Items personally reviewed/procedural attestation: I was present for and supervised the entire  procedure.    Maurice Au MD         Again, thank you for allowing me to participate in the care of your patient.      Sincerely,    Maurice Au MD

## 2022-04-08 NOTE — PATIENT INSTRUCTIONS
"Today's Recommendations:  - There are three basic paths that get taken in parallel to get you better (and I do think you CAN get better, depsite the length of these symptoms):    1) Medication changes. I will make some specific recommendations to Dr Alegre, but the headlines are:  - I don't think your SSRIs have been at adequate dose, and I think they should actually be tried above the 's dose. This is what we typically recommend for patients with high anxiety.  - Going down on the clonazepam (slowly) is likely a good idea to try, because your motivation may improve.    2) TMS. See below for the [facility] patient info. I am not going to initiate an insurance authorization yet, because I believe we need to get (3) below into place first. I think the combination of TMS and therapy is quite likely to work for you.    3) Therapy. It is my strong opinion that unless we get you back into psychotherapy, and specifically what is called \"exposure oriented\" or \"behavioral activation\" cognitive-behavioral therapy, a school that would specifically work on pushing you strongly out of your comfort zone.  I can work with therapists here in our clinic to make that possible, or there are perfectly good practitioners within the [facility] system if you prefer.    You could indeed also consider what is called \"psychoanalytic\" or \"psychodynamic\" therapy. A good list of local providers who have real training in that process is at https://mpsi.org/ .      As we talked about in visit, my strong opinion is that our only path to getting you better is to make things pretty uncomfortable in the short term.    One other thing we can offer, if it is of interest, is possibly a referral to one of our family therapists to help identify if there are ways that you and your family can interact differently to support your moving forward. Let me know if you'd like that referral, we have an internal team.    The [facility] TMS info:  A " "standard TMS treatment course is 6 weeks of daily treatment, Monday through Friday. Treatments can take anywhere from 10 to 30 minutes, depending on the exact protocol we use.  Common side effects during the procedure and immediately after are a mild headache, dizziness, and a sense of tingling in your scalp. Most of these go away quite quickly, within an hour of treatment. It is possible to have a seizure during TMS, which is more common if you take certain medications. We carefully monitor and adjust dosing to prevent this, and in our clinic, the risk of a seizure is less than 1 in 10,000.    We monitor your progress throughout TMS treatment. If you are not improving after about 3 weeks, we will usually change to a slightly different TMS protocol. This can extend the treatment course beyond the usual 1.5 months.            We are specifically a university and research clinic, and there are often research studies ongoing. Some of those are clinical trials of new brain stimulation treatments. Others are what we would call \"biomarker\" studies, where we ask you to participate in some kind of measurement while you are undergoing regular treatment. You may be called by one of our clinical research coordinators about these studies. If you do not want to be contacted, please let us know. (Being called does not mean you have to be in a study.) In some cases, a clinical trial is the only way to get access to an advanced treatment that is not covered by your insurance. Usually, we will talk about this in your visit if it is an option.      Patient Education       General Information:  Our Treatment-Resistant Disorders/Interventional Psychiatry clinic is what is often called a \"consultation\" or \"tertiary care\" clinic. That means we do not see patients long-term for medication management or talk therapy. We offer thorough evaluations, recommendations about medications/therapies you may have not yet tried, and in some cases, " brain stimulation or office-based treatments. If you are likely to benefit from one of those advanced treatments, we will have talked about it today. Once that treatment is complete, we will see you once or twice afterwards to check in, and then you will return to getting ongoing psychiatric care from whomever you were seeing before you came for your evaluation with us. If for some reason you no longer have access to that clinician, we can help with a referral to our main [facility]. The only patients we see long-term are patients with implanted medical devices that require ongoing monitoring and programming.     Our recommendations almost always include some kind of cognitive-behavioral therapy (CBT) if you are not getting it already. Brain and nerve stimulation treatments work best when combined with certain talk therapies. We make these recommendations because we strongly believe that, without the therapy piece, most people will not get better, or will have limited clinical benefit. It is often difficult or inconvenient to add therapy to an already busy schedule, but it is also necessary.    It is important to remember that, like all mental health treatments, our interventional therapies are not perfect. About one third of people will not feel better after treatment, and even when they work, they do not take away the symptoms entirely.If we have recommended something above, it means we think there is a better than even chance of it working, but things are never guaranteed. This is another reason we usually recommend CBT along with our advanced treatments -- it can address the symptoms that remain after the stimulation/ketamine treatment.       While we are waiting to implement the recommendations you and I have discussed, you should know what to do if your symptoms worsen. A variety of crisis numbers/resources are available. They include:    CRISIS GENERAL NUMBERS   4-881-DPVSKEF (1-635.599.7154)   OR  911     CRISIS INTERVENTION TEAM (CIT) - this is a POLICE UNIT, specially trained.  This website has information for all of [place]'s CITs. Lays out which areas have this team.  Http://cit.[email or web address]/citmap/[place].php  However, one can just call 911 and ask for this special team.   If police need to be called, DO ask for this team.    CRISIS MOBILE TEAMS  [and see end of this phrase*]  [facility] -COPE: 24hrs/7days:    [phone]  (Adults > 17yo)    [phone]  (Child   < 16yo)                                       FRONT DOOR (during the day could call)   [phone]    [facility] -[phone] (Adult)  [phone] (Child)     [facility] [place] [facility] -[phone] (Adult and Child)     [facility] -[phone] ([employer] mobile crisis team; Adult and Child; 24hr)     [facility] -[phone] (Adult and Child)     CRISIS HOUSING  [facility]                           [address]              [phone]  [facility]                                [address]                       [phone]  [facility]                               [address]     [phone]   [facility]  [address]                [phone]    [place] EMERGENCY NUMBERS      Crisis Connection:                                [phone]    [facility]:     [phone]    Crisis Intervention:                                [phone] or [phone]     COPE: Mobile Team 24hrs/7days:    [phone]  (Adults > 17yo)                                                                            [phone]  (Child   < 16yo)  [facility]        [phone] 24/7 line and Mobile Team   [address] [place] [address]  DROP IN:  M-F: 8:00 am - 7:00 pm  Sat: 11:00 am - 3:00 pm   Sun:  "Closed     WALK-IN COUNSELING:  [facility]       [phone]    [address]:   M, W, F:  1:00-3:00PM    M-Th:  6:30-8:30PM    TRANS and LGBT  Call [employer] at [phone]. This service is staffed by trans people .   LGBT youth <24 contemplating suicide, call the [employer] and [employer] Lifeline [phone].    POISON CONTROL CENTER  [phone]    OR  go to nearest ER    CHILD  \"[facility] has a needs assessment team who will do an intake evaluation. Based on the results of the intake they will recommended inpatient admission, partial hospitalization, intensive outpatient or outpatient care. The number is [phone]. \"    CRISIS TEXT LINE  Http://www.crisistextline.org  FREE SUPPORT AT YOUR FINGERTIPS,   Crisis Text Line serves anyone, in any type of crisis, providing access to free,  support and information via the medium people already use and trust: text. Here s how it works:  1)  Text 674510 from anywhere in the [place], anytime, about any type of crisis.  2)  A live, trained Crisis Counselor receives the text and responds quickly.      The volunteer Crisis Counselor will help you move from a 'hot moment to a cool moment'    [facility] EMERGENCY NUMBERS      Crisis Connection:                                [phone]    [facility]:              [phone]    Crisis Intervention:                                [phone] or [phone]     COPE: Mobile Team 24hrs/7days:    [phone]  (Adults > 19yo)                                                                            [phone]  (Child   < 18yo)  [facility]        [phone]  CALL 24 hours a day.  [address] [place] [address]  DROP IN:  M-F: 8:00 am - 7:00 pm  Sat: 11:00 am - 3:00 pm   Sun: Closed    WALK-IN COUNSELIN)  [facility]                                  [phone]        [address]:                 " " M, W:      5:00-7:00PM       2)  [facility]                            [phone]        [facility], [address] [place] [address]                T, Th:      6:00-8:00PM    TRANS and LGBT  Call [employer] at [phone]. This service is staffed by trans people 24/7.   LGBT youth <24 contemplating suicide, call the [employer] Lifeline [phone].    POISON CONTROL CENTER  [phone]    OR  go to nearest ER    CHILD  \"[facility] has a needs assessment team who will do an intake evaluation. Based on the results of the intake they will recommended inpatient admission, partial hospitalization, intensive outpatient or outpatient care. The number is [phone] or [phone]. \"    CRISIS TEXT LINE  Http://www.crisistextline.org  FREE SUPPORT AT YOUR FINGERTIPS, 24/7  Crisis Text Line serves anyone, in any type of crisis, providing access to free, 24/7 support and information via the medium people already use and trust: text. Here s how it works:  1)  Text 164-856 from anywhere in the [place], anytime, about any type of crisis.  2)  A live, trained Crisis Counselor receives the text and responds quickly.      The volunteer Crisis Counselor will help you move from a 'hot moment to a cool moment'      * A Community Paramedic (CP) is an advanced paramedic that works to increase access to primary and preventive care and decrease use of emergency departments, which in turn decreases health care costs. Among other things, CPs may play a key role in providing follow-up services after a hospital discharge to prevent hospital readmission. CPs can provide health assessments, chronic disease monitoring and education, medication management, immunizations and vaccinations, laboratory specimen collection, hospital discharge follow-up care and minor medical procedures. CPs work under the direction of an Ambulance Medical Director.   "

## 2023-01-08 ENCOUNTER — HEALTH MAINTENANCE LETTER (OUTPATIENT)
Age: 33
End: 2023-01-08

## 2023-04-23 ENCOUNTER — HEALTH MAINTENANCE LETTER (OUTPATIENT)
Age: 33
End: 2023-04-23

## 2024-06-30 ENCOUNTER — HEALTH MAINTENANCE LETTER (OUTPATIENT)
Age: 34
End: 2024-06-30